# Patient Record
Sex: FEMALE | Race: WHITE | NOT HISPANIC OR LATINO | Employment: FULL TIME | URBAN - METROPOLITAN AREA
[De-identification: names, ages, dates, MRNs, and addresses within clinical notes are randomized per-mention and may not be internally consistent; named-entity substitution may affect disease eponyms.]

---

## 2024-02-02 ENCOUNTER — HOSPITAL ENCOUNTER (OUTPATIENT)
Dept: RADIOLOGY | Facility: CLINIC | Age: 64
Discharge: HOME | End: 2024-02-02
Payer: COMMERCIAL

## 2024-02-02 ENCOUNTER — OFFICE VISIT (OUTPATIENT)
Dept: ORTHOPEDIC SURGERY | Facility: CLINIC | Age: 64
End: 2024-02-02
Payer: COMMERCIAL

## 2024-02-02 VITALS — WEIGHT: 199 LBS | BODY MASS INDEX: 39.07 KG/M2 | HEIGHT: 60 IN

## 2024-02-02 DIAGNOSIS — M25.562 LEFT KNEE PAIN: ICD-10-CM

## 2024-02-02 DIAGNOSIS — M17.12 ARTHRITIS OF LEFT KNEE: Primary | ICD-10-CM

## 2024-02-02 PROCEDURE — 73562 X-RAY EXAM OF KNEE 3: CPT | Mod: LT

## 2024-02-02 PROCEDURE — 99203 OFFICE O/P NEW LOW 30 MIN: CPT | Performed by: FAMILY MEDICINE

## 2024-02-02 PROCEDURE — 73562 X-RAY EXAM OF KNEE 3: CPT | Mod: LEFT SIDE | Performed by: RADIOLOGY

## 2024-02-02 RX ORDER — MELOXICAM 15 MG/1
TABLET ORAL
Qty: 30 TABLET | Refills: 0 | Status: SHIPPED | OUTPATIENT
Start: 2024-02-02

## 2024-02-02 NOTE — PROGRESS NOTES
History of Present Illness   Chief Complaint   Patient presents with    Left Knee - Pain       The patient is 63 y.o. female  here with a complaint of left knee pain.  Onset of symptoms a little over a month ago, says she was walking down some stairs, says that she somehow jammed her knee as she was walking.  Acute onset of pain with this, was able to walk/bear weight, feels like there was some swelling, after that she said that she was having intermittent instability episodes.  She was seen in the urgent care last week, no x-rays were obtained, recommended outpatient orthopedic follow-up.  She states that over the past week she has started to see some improvement in symptoms though she is not back to her baseline, she is no longer having recurrent instability however yesterday she twisted her knee and felt a sharp pain, pain mostly over the medial knee as well as some pain deep within the knee.  She feels like her swelling has resolved, range of motion is improving.  She has been taking over-the-counter medications for pain.  She does admit to history of seronegative rheumatoid diagnosis around 8 years ago, no longer on medication for this, says she was taking meloxicam previously, at that time she was having some generalized muscle/joint pain.  She says for the most part she is doing okay however if she overdoes it to a certain extreme she will have some generalized joint pain for a few days after this but typically does well, no specific focal left knee issues in the past.    Past Medical History:   Diagnosis Date    Personal history of other diseases of the nervous system and sense organs     History of glaucoma    Personal history of urinary calculi     Personal history of renal calculi       Medication Documentation Review Audit       Reviewed by Kelsie Morton LPN (Licensed Nurse) on 02/02/24 at 0830      Medication Order Taking? Sig Documenting Provider Last Dose Status            No Medications to  Display                                   Allergies   Allergen Reactions    Shrimp Unknown    Wheat Unknown       Social History     Socioeconomic History    Marital status: Unknown     Spouse name: Not on file    Number of children: Not on file    Years of education: Not on file    Highest education level: Not on file   Occupational History    Not on file   Tobacco Use    Smoking status: Former     Types: Cigarettes    Smokeless tobacco: Not on file   Substance and Sexual Activity    Alcohol use: Yes     Comment: MODERATELY    Drug use: Never    Sexual activity: Not on file   Other Topics Concern    Not on file   Social History Narrative    Not on file     Social Determinants of Health     Financial Resource Strain: Not on file   Food Insecurity: Not on file   Transportation Needs: Not on file   Physical Activity: Not on file   Stress: Not on file   Social Connections: Not on file   Intimate Partner Violence: Not on file   Housing Stability: Not on file       Past Surgical History:   Procedure Laterality Date    CHOLECYSTECTOMY  01/22/2014    Cholecystectomy          Review of Systems   GENERAL: Negative  GI: Negative  MUSCULOSKELETAL: See HPI  SKIN: Negative  NEURO:  Negative     Physical Exam:    General/Constitutional: well appearing, no distress, appears stated age  HEENT: sclera clear  Respiratory: non labored breathing  Vascular: No edema, swelling or tenderness, except as noted in detailed exam.  Integumentary: No impressive skin lesions present, except as noted in detailed exam.  Neurological:  Alert and oriented   Psychological:  Normal mood and affect.  Musculoskeletal: Normal, except as noted in detailed exam and in HPI.  Normal gait, unassisted      Left knee: Normal appearance, no swelling, no redness or warmth.  No joint effusion is present.  There is some medial joint tenderness to palpation, no other areas of notable tenderness.  Range of motion from 0 to 130 degrees, there is some mild crepitus,  some discomfort at end range of flexion.  No motor deficits or pain with strength testing.  Negative Baptiste, negative Lachman, negative posterior drawer.  Stable to varus and stress     Imaging: X-rays of left knee obtained today and independently reviewed.  There is some mild degenerative changes most prominent in the patellofemoral compartment with some osteophytosis, there does appear to be a posterior osteophyte off the proximal tibia seen on lateral view as well, no acute abnormalities are seen      Assessment   1. Arthritis of left knee  Referral to Physical Therapy    meloxicam (Mobic) 15 mg tablet      2. Left knee pain  XR knee left 3 views    Referral to Physical Therapy    meloxicam (Mobic) 15 mg tablet            Plan: Left knee pain, onset of symptoms a little over a month ago, some improvement over the past week, symptoms thought to be aggravation of underlying mild osteoarthritis.  Discussed treatment options with patient.  She was interested in conservative management, referral to physical therapy was placed to work on some knee strengthening, stability.  Prescription for meloxicam was also provided in the short-term.  She will follow-up as symptoms dictate.

## 2024-03-01 ENCOUNTER — APPOINTMENT (OUTPATIENT)
Dept: ORTHOPEDIC SURGERY | Facility: CLINIC | Age: 64
End: 2024-03-01
Payer: COMMERCIAL